# Patient Record
Sex: MALE | Race: WHITE
[De-identification: names, ages, dates, MRNs, and addresses within clinical notes are randomized per-mention and may not be internally consistent; named-entity substitution may affect disease eponyms.]

---

## 2019-02-11 ENCOUNTER — HOSPITAL ENCOUNTER (OUTPATIENT)
Dept: HOSPITAL 19 - COL.RAD | Age: 34
End: 2019-02-11
Attending: FAMILY MEDICINE
Payer: COMMERCIAL

## 2019-02-11 DIAGNOSIS — R16.0: ICD-10-CM

## 2019-02-11 DIAGNOSIS — R10.11: ICD-10-CM

## 2019-02-11 DIAGNOSIS — R74.0: Primary | ICD-10-CM

## 2020-03-12 ENCOUNTER — HOSPITAL ENCOUNTER (OUTPATIENT)
Dept: HOSPITAL 19 - COL.RAD | Age: 35
End: 2020-03-12
Attending: PHYSICIAN ASSISTANT
Payer: COMMERCIAL

## 2020-03-12 VITALS — SYSTOLIC BLOOD PRESSURE: 152 MMHG | HEART RATE: 74 BPM | DIASTOLIC BLOOD PRESSURE: 99 MMHG

## 2020-03-12 VITALS — BODY MASS INDEX: 42.66 KG/M2 | WEIGHT: 315 LBS | HEIGHT: 72.01 IN

## 2020-03-12 VITALS — SYSTOLIC BLOOD PRESSURE: 116 MMHG | DIASTOLIC BLOOD PRESSURE: 68 MMHG | HEART RATE: 74 BPM

## 2020-03-12 VITALS — DIASTOLIC BLOOD PRESSURE: 82 MMHG | SYSTOLIC BLOOD PRESSURE: 127 MMHG | HEART RATE: 60 BPM

## 2020-03-12 VITALS — DIASTOLIC BLOOD PRESSURE: 73 MMHG | HEART RATE: 61 BPM | SYSTOLIC BLOOD PRESSURE: 115 MMHG

## 2020-03-12 VITALS — DIASTOLIC BLOOD PRESSURE: 79 MMHG | HEART RATE: 65 BPM | SYSTOLIC BLOOD PRESSURE: 118 MMHG

## 2020-03-12 VITALS — HEART RATE: 69 BPM | DIASTOLIC BLOOD PRESSURE: 69 MMHG | SYSTOLIC BLOOD PRESSURE: 124 MMHG

## 2020-03-12 DIAGNOSIS — K76.0: Primary | ICD-10-CM

## 2020-03-12 LAB
INR BLD: 1.1 (ref 0.8–3)
PROTHROMBIN TIME: 12.5 SECONDS (ref 9.7–12.8)

## 2020-03-12 PROCEDURE — 27433: CPT

## 2020-03-12 NOTE — NUR
INSTRUCTED PT AND SPOUSE THAT THEY WOIULD BE THE ONES TO TAKESPECIMEN TO [facility] AS WE DO NOT HAVE A CONTRACT WITH THEM. WIFE PLANS ON TAKEING THE SPECIMEN
TO [facility] DURING THE RECOVERY TIME.  GIVEN ADDRESS.

## 2020-11-17 ENCOUNTER — HOSPITAL ENCOUNTER (OUTPATIENT)
Dept: HOSPITAL 19 - COL.RAD | Age: 35
End: 2020-11-17
Attending: PHYSICIAN ASSISTANT
Payer: COMMERCIAL

## 2020-11-17 DIAGNOSIS — K76.0: Primary | ICD-10-CM

## 2021-03-12 ENCOUNTER — HOSPITAL ENCOUNTER (OUTPATIENT)
Dept: HOSPITAL 19 - SDCO | Age: 36
Discharge: HOME | End: 2021-03-12
Attending: ORTHOPAEDIC SURGERY
Payer: COMMERCIAL

## 2021-03-12 VITALS — TEMPERATURE: 97.9 F | HEART RATE: 81 BPM | SYSTOLIC BLOOD PRESSURE: 141 MMHG | DIASTOLIC BLOOD PRESSURE: 89 MMHG

## 2021-03-12 VITALS — HEIGHT: 72 IN | BODY MASS INDEX: 42.66 KG/M2 | WEIGHT: 315 LBS

## 2021-03-12 VITALS — DIASTOLIC BLOOD PRESSURE: 67 MMHG | HEART RATE: 55 BPM | TEMPERATURE: 98.1 F | SYSTOLIC BLOOD PRESSURE: 129 MMHG

## 2021-03-12 VITALS — DIASTOLIC BLOOD PRESSURE: 67 MMHG | SYSTOLIC BLOOD PRESSURE: 129 MMHG | TEMPERATURE: 98.1 F | HEART RATE: 61 BPM

## 2021-03-12 VITALS — HEART RATE: 79 BPM | SYSTOLIC BLOOD PRESSURE: 138 MMHG | DIASTOLIC BLOOD PRESSURE: 87 MMHG

## 2021-03-12 VITALS — DIASTOLIC BLOOD PRESSURE: 78 MMHG | SYSTOLIC BLOOD PRESSURE: 120 MMHG | HEART RATE: 62 BPM

## 2021-03-12 VITALS — HEART RATE: 63 BPM | SYSTOLIC BLOOD PRESSURE: 134 MMHG | DIASTOLIC BLOOD PRESSURE: 73 MMHG

## 2021-03-12 DIAGNOSIS — Z79.899: ICD-10-CM

## 2021-03-12 DIAGNOSIS — Z20.822: ICD-10-CM

## 2021-03-12 DIAGNOSIS — K76.0: ICD-10-CM

## 2021-03-12 DIAGNOSIS — G47.33: ICD-10-CM

## 2021-03-12 DIAGNOSIS — S83.241A: Primary | ICD-10-CM

## 2021-03-12 DIAGNOSIS — E66.01: ICD-10-CM

## 2021-03-12 NOTE — NUR
TO RM 7 PER CART FROM PACU. DROWSY, BUT ORIENTED X3 AND TALKING WITH STAFF.
RIGHT LEG ELEVATED AND ICED AS ORDERED. C/O MILD PAIN.
DENIES NAUSEA OR VOMITING.
DRESSING CLEAN DRY AND INTACT.

## 2021-06-02 ENCOUNTER — HOSPITAL ENCOUNTER (OUTPATIENT)
Dept: HOSPITAL 19 - COL.RAD | Age: 36
End: 2021-06-02
Attending: PHYSICIAN ASSISTANT
Payer: COMMERCIAL

## 2021-06-02 DIAGNOSIS — R79.89: ICD-10-CM

## 2021-06-02 DIAGNOSIS — R74.8: ICD-10-CM

## 2021-06-02 DIAGNOSIS — K76.0: Primary | ICD-10-CM

## 2022-06-02 ENCOUNTER — HOSPITAL ENCOUNTER (OUTPATIENT)
Dept: HOSPITAL 19 - COL.RAD | Age: 37
End: 2022-06-02
Attending: FAMILY MEDICINE
Payer: COMMERCIAL

## 2022-06-02 DIAGNOSIS — K76.0: Primary | ICD-10-CM
